# Patient Record
Sex: MALE
[De-identification: names, ages, dates, MRNs, and addresses within clinical notes are randomized per-mention and may not be internally consistent; named-entity substitution may affect disease eponyms.]

---

## 2018-01-31 ENCOUNTER — HOSPITAL ENCOUNTER (EMERGENCY)
Dept: HOSPITAL 42 - ED | Age: 48
Discharge: HOME | End: 2018-01-31
Payer: COMMERCIAL

## 2018-01-31 VITALS — TEMPERATURE: 100.2 F

## 2018-01-31 VITALS
SYSTOLIC BLOOD PRESSURE: 157 MMHG | DIASTOLIC BLOOD PRESSURE: 98 MMHG | HEART RATE: 101 BPM | OXYGEN SATURATION: 96 % | RESPIRATION RATE: 16 BRPM

## 2018-01-31 VITALS — BODY MASS INDEX: 29.1 KG/M2

## 2018-01-31 DIAGNOSIS — J11.1: Primary | ICD-10-CM

## 2018-01-31 DIAGNOSIS — I10: ICD-10-CM

## 2018-01-31 DIAGNOSIS — A08.4: ICD-10-CM

## 2018-01-31 LAB
ALBUMIN SERPL-MCNC: 4.4 G/DL (ref 3–4.8)
ALBUMIN/GLOB SERPL: 1.4 {RATIO} (ref 1.1–1.8)
ALT SERPL-CCNC: 38 U/L (ref 7–56)
APPEARANCE UR: CLEAR
AST SERPL-CCNC: 30 U/L (ref 17–59)
BACTERIA #/AREA URNS HPF: (no result) /[HPF]
BASE EXCESS BLDV CALC-SCNC: 4.3 MMOL/L (ref 0–2)
BASOPHILS # BLD AUTO: 0.02 K/MM3 (ref 0–2)
BASOPHILS NFR BLD: 0.2 % (ref 0–3)
BILIRUB UR-MCNC: NEGATIVE MG/DL
BUN SERPL-MCNC: 16 MG/DL (ref 7–21)
CALCIUM SERPL-MCNC: 9.4 MG/DL (ref 8.4–10.5)
COLOR UR: YELLOW
EOSINOPHIL # BLD: 0 10*3/UL (ref 0–0.7)
EOSINOPHIL NFR BLD: 0.1 % (ref 1.5–5)
EPI CELLS #/AREA URNS HPF: (no result) /HPF (ref 0–5)
ERYTHROCYTE [DISTWIDTH] IN BLOOD BY AUTOMATED COUNT: 13.8 % (ref 11.5–14.5)
GFR NON-AFRICAN AMERICAN: > 60
GLUCOSE UR STRIP-MCNC: NEGATIVE MG/DL
GRANULOCYTES # BLD: 9.91 10*3/UL (ref 1.4–6.5)
GRANULOCYTES NFR BLD: 87.8 % (ref 50–68)
HGB BLD-MCNC: 14 G/DL (ref 14–18)
LEUKOCYTE ESTERASE UR-ACNC: NEGATIVE LEU/UL
LG PLATELETS BLD QL SMEAR: PRESENT
LIPASE SERPL-CCNC: 56 U/L (ref 23–300)
LYMPHOCYTE: 3 % (ref 22–35)
LYMPHOCYTES # BLD: 0.3 10*3/UL (ref 1.2–3.4)
LYMPHOCYTES NFR BLD AUTO: 2.5 % (ref 22–35)
MCH RBC QN AUTO: 29.2 PG (ref 25–35)
MCHC RBC AUTO-ENTMCNC: 33.3 G/DL (ref 31–37)
MCV RBC AUTO: 87.5 FL (ref 80–105)
MONOCYTE: 8 % (ref 1–6)
MONOCYTES # BLD AUTO: 1.1 10*3/UL (ref 0.1–0.6)
MONOCYTES NFR BLD: 9.4 % (ref 1–6)
NEUTROPHILS NFR BLD AUTO: 89 % (ref 50–70)
PH BLDV: 7.41 [PH] (ref 7.32–7.43)
PH UR STRIP: 7 [PH] (ref 4.7–8)
PLATELET # BLD: 142 10^3/UL (ref 120–450)
PMV BLD AUTO: 11.7 FL (ref 7–11)
PROT UR STRIP-MCNC: NEGATIVE MG/DL
RBC # BLD AUTO: 4.8 10^6/UL (ref 3.5–6.1)
RBC # UR STRIP: (no result) /UL
SP GR UR STRIP: 1.02 (ref 1–1.03)
URINE NITRATE: NEGATIVE
UROBILINOGEN UR STRIP-ACNC: 0.2 E.U./DL
VENOUS BLOOD FIO2: 21 %
VENOUS BLOOD GAS PCO2: 47 (ref 40–60)
VENOUS BLOOD GAS PO2: 47 MM/HG (ref 30–55)
WBC # BLD AUTO: 11.3 10^3/UL (ref 4.5–11)
WBC #/AREA URNS HPF: NEGATIVE /HPF (ref 0–6)

## 2018-01-31 PROCEDURE — 81001 URINALYSIS AUTO W/SCOPE: CPT

## 2018-01-31 PROCEDURE — 93005 ELECTROCARDIOGRAM TRACING: CPT

## 2018-01-31 PROCEDURE — 96375 TX/PRO/DX INJ NEW DRUG ADDON: CPT

## 2018-01-31 PROCEDURE — 71046 X-RAY EXAM CHEST 2 VIEWS: CPT

## 2018-01-31 PROCEDURE — 99284 EMERGENCY DEPT VISIT MOD MDM: CPT

## 2018-01-31 PROCEDURE — 80053 COMPREHEN METABOLIC PANEL: CPT

## 2018-01-31 PROCEDURE — 87040 BLOOD CULTURE FOR BACTERIA: CPT

## 2018-01-31 PROCEDURE — 83690 ASSAY OF LIPASE: CPT

## 2018-01-31 PROCEDURE — 87804 INFLUENZA ASSAY W/OPTIC: CPT

## 2018-01-31 PROCEDURE — 82803 BLOOD GASES ANY COMBINATION: CPT

## 2018-01-31 PROCEDURE — 96374 THER/PROPH/DIAG INJ IV PUSH: CPT

## 2018-01-31 PROCEDURE — 87086 URINE CULTURE/COLONY COUNT: CPT

## 2018-01-31 PROCEDURE — 85025 COMPLETE CBC W/AUTO DIFF WBC: CPT

## 2018-01-31 RX ADMIN — IPRATROPIUM BROMIDE AND ALBUTEROL SULFATE STA ML: .5; 3 SOLUTION RESPIRATORY (INHALATION) at 13:22

## 2018-01-31 NOTE — CARD
--------------- APPROVED REPORT --------------





EKG Measurement

Heart [id number]

CT 128P27

[id number]

[id number]

[id number]



<Conclusion>

Sinus tachycardia

Voltage criteria for left ventricular hypertrophy

Abnormal ECG

## 2018-01-31 NOTE — ED PDOC
Arrival/HPI





- General


Historian: Patient





- History of Present Illness


Time/Duration: > month


Symptom Onset: Gradual


Symptom Course: Worsening


Activities at Onset: Light





<Anny Randle - Last Filed: 01/31/18 14:56>





<Bernardo Mcghee - Last Filed: 01/31/18 20:32>





- General


Chief Complaint: Flu-like Symptoms


Time Seen by Provider: 01/31/18 11:47





- History of Present Illness


Narrative History of Present Illness (Text): 





CC: coughing, vomiting


47 M went to [facility] December 21st and came back in January. One week before 

coming back to the [place], patient started coughing, and a week later, went to Dr. Harris and received an injection for treatment as well as ranitidine. Patient 

stated he still didn't feel better and returned two weeks later (one week from 

today) and got another treatment, Patient just finished Z-woodrow. Patient started 

vomiting this morning with lower legs aching, patient states his fever started 

yesterday. Patient states his legs are aching. He admits to fever, productive 

cough, headache, chills, lower back pain bilaterally. Patient denies weakness, 

numbness, tingling, syncope, rashes, diarrhea, constipation. 





PMH:  HTN, Heart Murmur


PSH: appendectomy 


PMD: Dr. Harris


01/31/18 13:12





01/31/18 13:26


 (Anny Randle)





Past Medical History





- Provider Review


Nursing Documentation Reviewed: Yes





- Travel History


If Yes, travel location?: [facility]





- Cardiac


Hx Cardiac Disorders: Yes


Hx Hypertension: Yes





- Pulmonary


Hx Respiratory Disorders: Yes


Hx Bronchitis: Yes





- Psychiatric


Hx Substance Use: No





<Anny Randle - Last Filed: 01/31/18 14:56>





Family/Social History





- Physician Review


Nursing Documentation Reviewed: Yes


Family/Social History: Unknown Family HX


Smoking Status: Never Smoked


Hx Alcohol Use: No


Hx Substance Use: No





<Anny Randle - Last Filed: 01/31/18 14:56>





Allergies/Home Meds





<Anny Randle - Last Filed: 01/31/18 14:56>





<Bernardo Mcghee - Last Filed: 01/31/18 20:32>


Allergies/Adverse Reactions: 


Allergies





No Known Allergies Allergy (Verified 01/31/18 12:32)


 








Home Medications: 


 Home Meds











 Medication  Instructions  Recorded  Confirmed


 


Azithromycin [Z-Woodrow] 250 mg PO DAILY 01/31/18 01/31/18


 


Guaifenesin/Pseudoephedrne HCl 1 ter PO PRN PRN 01/31/18 01/31/18





[Mucinex D 600 mg-60 mg]   


 


Ranitidine HCl [Zantac] 150 mg PO DAILY 01/31/18 01/31/18


 


amLODIPine [Norvasc] 5 mg PO DAILY 01/31/18 01/31/18














Review of Systems





- Review of Systems


Eyes: absent: Vision Changes, Photophobia, Eye Pain


ENT: absent: Hearing Changes, Tinnitus, TMJ Pain


Respiratory: SOB, Cough, Sputum.  absent: Wheezing


Cardiovascular: Palpitations.  absent: Chest Pain, Edema, Calf Pain


Gastrointestinal: Vomiting.  absent: Abdominal Pain, Stool Changes, Constipation

, Diarrhea, Appetite Changes


Musculoskeletal: Back Pain (lower back pain), Other (lower extremity aches).  

absent: Arthralgias, Neck Pain, Joint Swelling, Myalgias


Skin: absent: Rash, Pruritis, Skin Lesions


Neurological: Headache.  absent: Dizziness, Focal Weakness, Gait Changes, 

Speech Changes, Facial Droop, Disequilibrium


Endocrine: absent: Diaphoresis, Polyuria, Polydipsia


Hemo/Lymphatic: absent: Adenopathy, Easy Bleeding, Easy Bruising


Psychiatric: absent: Anxiety, Depression, Suicidal Ideation





<Anny Randle - Last Filed: 01/31/18 14:56>





Physical Exam


Vital Signs Reviewed: Yes


Temperature: Febrile


Blood Pressure: Normal


Pulse: Tachycardic


Respiratory Rate: Normal


Appearance: Positive for: Ill-Appearing, Uncomfortable


Mental Status: Positive for: Alert and Oriented X 3





- Systems Exam


Head: Present: Atraumatic, Normocephalic


Pupils: Present: PERRL


Extroacular Muscles: Present: EOMI


Conjunctiva: Present: Normal


Mouth: Present: Moist Mucous Membranes


Nose (External): Present: Atraumatic.  No: Abrasion, Contusion, Laceration


Neck: Present: Normal Range of Motion, Trachea Midline.  No: MIDLINE TENDERNESS

, JVD


Respiratory/Chest: Present: Clear to Auscultation, Good Air Exchange.  No: 

Respiratory Distress, Accessory Muscle Use, Decreased Breath Sounds


Cardiovascular: Present: Regular Rate and Rhythm, Normal S1, S2.  No: Murmurs


Back: Present: CVA Tenderness


Upper Extremity: Present: Normal Inspection, Normal ROM, NORMAL PULSES, 

Capillary Refill < 2s.  No: Edema


Lower Extremity: Present: Normal Inspection, NORMAL PULSES, Normal ROM, 

Capillary Refill < 2 s.  No: Edema


Neurological: Present: GCS=15, CN II-XII Intact, Speech Normal, Motor Func 

Grossly Intact, Normal Sensory Function, Other (no nuchal rigidity)


Skin: Present: Warm, Normal Color.  No: Dry, Rashes


Psychiatric: Present: Alert, Oriented x 3, Normal Insight, Normal Concentration





<Anny Randle - Last Filed: 01/31/18 14:56>


Vital Signs











  Temp Pulse Resp BP Pulse Ox


 


 01/31/18 15:37  100.2 F H    


 


 01/31/18 15:05   101 H  16  157/98 H  96


 


 01/31/18 15:00   94 H  18  119/77  97


 


 01/31/18 14:22  98.5 F    


 


 01/31/18 12:24  102.9 F H  117 H  18  111/90  97














Medical Decision Making


Re-evaluation Time: 14:30


Reassessment Condition: Re-examined, Improving,but remains with symptoms





- EKG Interpretation


Interpreted by ED Physician: Yes


Type: 12 lead EKG





<Anny Randle - Last Filed: 01/31/18 14:56>





<Bernardo Mcghee - Last Filed: 01/31/18 20:32>


ED Course and Treatment: 





01/31/18 13:17


sepsis: 102.9F, Tachycardia


Tylenol 650mg PO STAT


duoneb, solumedrol 125mg IVP


Toradol 30mg IVP stat


NS 1L bolus





VBG shock panel


blood culture


urine culture


CMP


Lipase


CBC


CXR


EKG: Sinus tachycardia at 117 bpm


01/31/18 14:45


Patient states he felt better after breathing treatment, fluids and tylenol for 

fever


Patient's abdominal pain is more mild now


Patient was told he tested positive for the flu. 


Patient was also told he has ketones and trace blood in his urine and advised 

to follow up with Dr. Harris (Anny Randle)





Patient Seen With Resident:


In agreement with resident note which contains more details about the patient. 

Patient was seen and evaluated with resident. Came up with plan and treatment 

together.


46 y/o M p/w myalgias and fever that began yesterday. On exam, tachycardic and 

warm to touch. Influenza positive, will treat with Tamiflu.


 (Bernardo Mcghee)





- Lab Interpretations


Lab Results: 








 01/31/18 12:45 





 01/31/18 13:10 





 Lab Results





01/31/18 13:50: Urine Color Yellow, Urine Appearance Clear, Urine pH 7.0, Ur 

Specific Gravity 1.020, Urine Protein Negative, Urine Glucose (UA) Negative, 

Urine Ketones 15 H, Urine Blood Trace-intact H, Urine Nitrate Negative, Urine 

Bilirubin Negative, Urine Urobilinogen 0.2, Ur Leukocyte Esterase Negative, 

Urine RBC 1 - 3, Urine WBC Negative, Ur Epithelial Cells 0 - 2, Urine Bacteria 

Trace


01/31/18 13:10: Sodium 134, Chloride 95 L, Potassium 3.6, Carbon Dioxide 26, 

Anion Gap 17, BUN 16, Creatinine 1.0, Est GFR (African Amer) > 60, Est GFR (Non-

Af Amer) > 60, Random Glucose 112 H, Calcium 9.4, Total Bilirubin 0.7, AST 30, 

ALT 38, Alkaline Phosphatase 81, Total Protein 7.5, Albumin 4.4, Globulin 3.1, 

Albumin/Globulin Ratio 1.4, Lipase 56


01/31/18 13:10: pO2 47, VBG pH 7.41, VBG pCO2 47.0, VBG HCO3 29.8 H, VBG Total 

CO2 31.2 H, VBG O2 Sat (Calc) 90.0 H, VBG Base Excess 4.3 H, VBG Potassium 3.9, 

Sodium 133.0, Chloride 97.0 L, Glucose 114 H, Lactate 1.7, FiO2 21.0, Venous 

Blood Potassium 3.9


01/31/18 13:10: Influenza Typ A,B (EIA) Pos for influenza a H


01/31/18 12:45: WBC 11.3 H, RBC 4.80, Hgb 14.0, Hct 42.0, MCV 87.5, MCH 29.2, 

MCHC 33.3, RDW 13.8, Plt Count 142, MPV 11.7 H, Gran % 87.8 H, Lymph % (Auto) 

2.5 L, Mono % (Auto) 9.4 H, Eos % (Auto) 0.1 L, Baso % (Auto) 0.2, Gran # 9.91 H

, Lymph # (Auto) 0.3 L, Mono # (Auto) 1.1 H, Eos # (Auto) 0.0, Baso # (Auto) 

0.02, Neutrophils % (Manual) 89 H, Lymphocytes % (Manual) 3 L, Monocytes % (

Manual) 8 H, Large Platelets Present














 01/31/18 12:45 





 01/31/18 13:10 





 Lab Results





01/31/18 13:50: Urine Color Yellow, Urine Appearance Clear, Urine pH 7.0, Ur 

Specific Gravity 1.020, Urine Protein Negative, Urine Glucose (UA) Negative, 

Urine Ketones 15 H, Urine Blood Trace-intact H, Urine Nitrate Negative, Urine 

Bilirubin Negative, Urine Urobilinogen 0.2, Ur Leukocyte Esterase Negative, 

Urine RBC 1 - 3, Urine WBC Negative, Ur Epithelial Cells 0 - 2, Urine Bacteria 

Trace


01/31/18 13:10: Sodium 134, Chloride 95 L, Potassium 3.6, Carbon Dioxide 26, 

Anion Gap 17, BUN 16, Creatinine 1.0, Est GFR (African Amer) > 60, Est GFR (Non-

Af Amer) > 60, Random Glucose 112 H, Calcium 9.4, Total Bilirubin 0.7, AST 30, 

ALT 38, Alkaline Phosphatase 81, Total Protein 7.5, Albumin 4.4, Globulin 3.1, 

Albumin/Globulin Ratio 1.4, Lipase 56


01/31/18 13:10: pO2 47, VBG pH 7.41, VBG pCO2 47.0, VBG HCO3 29.8 H, VBG Total 

CO2 31.2 H, VBG O2 Sat (Calc) 90.0 H, VBG Base Excess 4.3 H, VBG Potassium 3.9, 

Sodium 133.0, Chloride 97.0 L, Glucose 114 H, Lactate 1.7, FiO2 21.0, Venous 

Blood Potassium 3.9


01/31/18 13:10: Influenza Typ A,B (EIA) Pos for influenza a H


01/31/18 12:45: WBC 11.3 H, RBC 4.80, Hgb 14.0, Hct 42.0, MCV 87.5, MCH 29.2, 

MCHC 33.3, RDW 13.8, Plt Count 142, MPV 11.7 H, Gran % 87.8 H, Lymph % (Auto) 

2.5 L, Mono % (Auto) 9.4 H, Eos % (Auto) 0.1 L, Baso % (Auto) 0.2, Gran # 9.91 H

, Lymph # (Auto) 0.3 L, Mono # (Auto) 1.1 H, Eos # (Auto) 0.0, Baso # (Auto) 

0.02, Neutrophils % (Manual) 89 H, Lymphocytes % (Manual) 3 L, Monocytes % (

Manual) 8 H, Large Platelets Present





urine positive for ketones and trace blood


leukocytosis 11.3 (Eng,Anny)





- RAD Interpretation


Radiology Orders: 








01/31/18 12:45


CHEST TWO VIEWS (PA/LAT) [RAD] Stat 














- EKG Interpretation


EKG Interpretation (Text): 





01/31/18 13:17


Sinus tachycardia at 117 bpm (Eng,Anny)





- Medication Orders


Current Medication Orders: 











Discontinued Medications





Acetaminophen (Tylenol 325mg Tab)  650 mg PO STAT STA


   Stop: 01/31/18 13:00


   Last Admin: 01/31/18 13:22  Dose: 650 mg





MAR Pain/Vitals


 Document     01/31/18 13:22  HI  (Rec: 01/31/18 13:22  [id number])


     Pain Reassessment


      Is This A Pain ReAssessment?               No


Re-Assess: MAR Pain/Vitals


 Document     01/31/18 14:22  HI  (Rec: 01/31/18 16:02  [id number])


     Vitals


      Temperature (97.6 F-99.6 F)                98.5 F


      Temperature Source                         Oral





Albuterol/Ipratropium (Duoneb 3 Mg/0.5 Mg (3 Ml) Ud)  3 ml IH STAT STA


   Stop: 01/31/18 13:09


   Last Admin: 01/31/18 13:22  Dose: 3 ml





Sodium Chloride (Sodium Chloride 0.9%)  1,000 mls @ 999 mls/hr IV .Q1H1M STA


   Stop: 01/31/18 13:46


   Last Admin: 01/31/18 13:23  Dose: 999 mls/hr





eMAR Start Stop


 Document     01/31/18 13:23  HI  (Rec: 01/31/18 13:23  [id number])


     Intravenous Solution


      Start Date                                 01/31/18


      Start Time                                 13:23





Sodium Chloride (Sodium Chloride 0.9%)  1,000 mls @ 999 mls/hr IV .Q1H1M STA


   Stop: 01/31/18 15:31


   Last Admin: 01/31/18 16:00  Dose: 999 mls/hr





eMAR Start Stop


 Document     01/31/18 16:00  HI  (Rec: 01/31/18 16:00  HI  [id number])


     Intravenous Solution


      Start Date                                 01/31/18


      Start Time                                 16:00





Ketorolac Tromethamine (Toradol)  30 mg IVP STAT STA


   Stop: 01/31/18 12:47


   Last Admin: 01/31/18 13:22  Dose: 30 mg





MAR Pain Assessment


 Document     01/31/18 13:22  HI  (Rec: 01/31/18 13:22  HI  [id number])


     Pain Reassessment


      Is this a pain reassessment?               No


     Sleep


      Is patient sleeping during reassessment?   No


     Presence of Pain


      Presence of Pain                           Yes


     Location


      Pain Location Body Site                    Generalized


IVP Administration


 Document     01/31/18 13:22  HI  (Rec: 01/31/18 13:22  HI  [id number])


     Charges for Administration


      # of IVP Administrations                   1





Methylprednisolone (Solu-Medrol)  125 mg IVP STAT STA


   Stop: 01/31/18 13:09


   Last Admin: 01/31/18 13:21  Dose: 125 mg





IVP Administration


 Document     01/31/18 13:21  HI  (Rec: 01/31/18 13:21  HI  [id number])


     Charges for Administration


      # of IVP Administrations                   1





Ondansetron HCl (Zofran Inj)  8 mg IVP STAT STA


   Stop: 01/31/18 13:14


   Last Admin: 01/31/18 13:22  Dose: 8 mg





IVP Administration


 Document     01/31/18 13:22  HI  (Rec: 01/31/18 13:23  HI  [id number])


     Charges for Administration


      # of IVP Administrations                   1














Disposition/Present on Arrival





- Present on Arrival


Any Indicators Present on Arrival: No


History of DVT/PE: No


History of Uncontrolled Diabetes: No


Urinary Catheter: No


History of Decub. Ulcer: No


History Surgical Site Infection Following: None





- Disposition


Have Diagnosis and Disposition been Completed?: Yes


Disposition Time: 14:46


Patient Plan: Discharge





<Anny Randle - Last Filed: 01/31/18 14:56>





- Disposition


Disposition Time: 14:12





<Bernardo Mcghee - Last Filed: 01/31/18 20:32>





- Disposition


Diagnosis: 


 Influenza, Viral gastritis





Disposition: HOME/ ROUTINE


Condition: IMPROVED


Additional Instructions: 


Chest xray showed no infiltrates


Patient was also told he has ketones and trace blood in his urine and advised 

to follow up with Dr. Harris


Patient tested positive for the flu


take tylenol as  directed on bottle for fever


stay hydrated, take zofran as needed for nausea, return to emergency room if 

extreme  abdominal pain, nausea, vomiting, diarrhea, blood in stool and  blood 

in vomit


Prescriptions: 


Ondansetron ODT [Zofran ODT] 8 mg PO Q6H #12 odt


Oseltamivir Phosphate [Tamiflu] 30 mg PO BID #10 capsule


Referrals: 


Vinay Harris [Primary Care Provider] - Follow up with primary


Forms:  [employer] (English)